# Patient Record
Sex: MALE | URBAN - METROPOLITAN AREA
[De-identification: names, ages, dates, MRNs, and addresses within clinical notes are randomized per-mention and may not be internally consistent; named-entity substitution may affect disease eponyms.]

---

## 2024-08-24 ENCOUNTER — NURSE TRIAGE (OUTPATIENT)
Dept: NURSING | Facility: CLINIC | Age: 24
End: 2024-08-24

## 2024-08-25 NOTE — TELEPHONE ENCOUNTER
"Red Transfer    Call received from spouseLidia  Verbal Consent from pt to speak with spouse.  Call changed to speaking directly with pt.    Cory reports new onset of Extreme Pain to his lower back and legs whenever he tries to move.    He is currently laying on the kitchen floor \"in a fetal position\".    He reports that prior to calling, his lower back \"seized up\" after he sneezed.  He fell over on to the floor.    Any time he tries to move his legs he experiences Severe Pain.  He was able to pull himself up using his arms on the counter but he cannot tolerate the pain when trying to use his legs.    911 advised    Soha Murdock RN  [facility]      Reason for Disposition   Shock suspected (e.g., cold/pale/clammy skin, too weak to stand, low BP, rapid pulse)    Additional Information   Negative: Dangerous mechanism of injury (e.g., MVA, contact sports, trampoline, diving, fall > 10 feet or 3 meters)  (Exception: Back pain began > 1 hour after injury.)   Negative: [1] Weakness (i.e., paralysis, loss of muscle strength) of the leg(s) or foot AND [2] sudden onset after back injury   Negative: [1] Numbness (i.e., loss of sensation) of the leg(s) or foot AND [2] sudden onset after back injury   Negative: [1] Major bleeding (e.g., actively dripping or spurting) AND [2] can't be stopped   Negative: Bullet wound, knife wound, or other penetrating object    Protocols used: Back Injury-A-    "